# Patient Record
Sex: MALE | Race: WHITE | NOT HISPANIC OR LATINO
[De-identification: names, ages, dates, MRNs, and addresses within clinical notes are randomized per-mention and may not be internally consistent; named-entity substitution may affect disease eponyms.]

---

## 2017-01-11 ENCOUNTER — APPOINTMENT (OUTPATIENT)
Dept: CARDIOTHORACIC SURGERY | Facility: CLINIC | Age: 67
End: 2017-01-11

## 2017-01-13 ENCOUNTER — APPOINTMENT (OUTPATIENT)
Dept: CARDIOTHORACIC SURGERY | Facility: CLINIC | Age: 67
End: 2017-01-13

## 2017-01-13 VITALS
TEMPERATURE: 97.4 F | HEART RATE: 70 BPM | HEIGHT: 72 IN | WEIGHT: 213 LBS | OXYGEN SATURATION: 98 % | BODY MASS INDEX: 28.85 KG/M2 | DIASTOLIC BLOOD PRESSURE: 71 MMHG | RESPIRATION RATE: 16 BRPM | SYSTOLIC BLOOD PRESSURE: 170 MMHG

## 2017-01-13 DIAGNOSIS — Z78.9 OTHER SPECIFIED HEALTH STATUS: ICD-10-CM

## 2017-01-13 DIAGNOSIS — Z71.9 COUNSELING, UNSPECIFIED: ICD-10-CM

## 2017-01-26 ENCOUNTER — EMERGENCY (EMERGENCY)
Facility: HOSPITAL | Age: 67
LOS: 1 days | Discharge: HOME | End: 2017-01-26
Attending: EMERGENCY MEDICINE | Admitting: EMERGENCY MEDICINE
Payer: MEDICARE

## 2017-01-26 VITALS
DIASTOLIC BLOOD PRESSURE: 73 MMHG | HEART RATE: 66 BPM | RESPIRATION RATE: 14 BRPM | HEIGHT: 71 IN | TEMPERATURE: 98 F | SYSTOLIC BLOOD PRESSURE: 104 MMHG | WEIGHT: 210.1 LBS | OXYGEN SATURATION: 95 %

## 2017-01-26 VITALS
TEMPERATURE: 99 F | OXYGEN SATURATION: 96 % | DIASTOLIC BLOOD PRESSURE: 84 MMHG | SYSTOLIC BLOOD PRESSURE: 132 MMHG | RESPIRATION RATE: 20 BRPM | HEART RATE: 71 BPM

## 2017-01-26 DIAGNOSIS — R55 SYNCOPE AND COLLAPSE: ICD-10-CM

## 2017-01-26 DIAGNOSIS — Z98.890 OTHER SPECIFIED POSTPROCEDURAL STATES: ICD-10-CM

## 2017-01-26 DIAGNOSIS — I10 ESSENTIAL (PRIMARY) HYPERTENSION: ICD-10-CM

## 2017-01-26 DIAGNOSIS — I72.4 ANEURYSM OF ARTERY OF LOWER EXTREMITY: Chronic | ICD-10-CM

## 2017-01-26 DIAGNOSIS — E78.5 HYPERLIPIDEMIA, UNSPECIFIED: ICD-10-CM

## 2017-01-26 DIAGNOSIS — Z88.0 ALLERGY STATUS TO PENICILLIN: ICD-10-CM

## 2017-01-26 LAB
ALBUMIN SERPL ELPH-MCNC: 3.4 G/DL — SIGNIFICANT CHANGE UP (ref 3.3–5)
ALP SERPL-CCNC: 53 U/L — SIGNIFICANT CHANGE UP (ref 40–120)
ALT FLD-CCNC: 48 U/L — SIGNIFICANT CHANGE UP (ref 12–78)
ANION GAP SERPL CALC-SCNC: 10 MMOL/L — SIGNIFICANT CHANGE UP (ref 5–17)
APTT BLD: 26 SEC — LOW (ref 27.5–37.4)
AST SERPL-CCNC: 52 U/L — HIGH (ref 15–37)
BASOPHILS # BLD AUTO: 0.1 K/UL — SIGNIFICANT CHANGE UP (ref 0–0.2)
BASOPHILS NFR BLD AUTO: 1.4 % — SIGNIFICANT CHANGE UP (ref 0–2)
BILIRUB SERPL-MCNC: 0.7 MG/DL — SIGNIFICANT CHANGE UP (ref 0.2–1.2)
BUN SERPL-MCNC: 24 MG/DL — HIGH (ref 7–23)
CALCIUM SERPL-MCNC: 8.7 MG/DL — SIGNIFICANT CHANGE UP (ref 8.5–10.1)
CHLORIDE SERPL-SCNC: 106 MMOL/L — SIGNIFICANT CHANGE UP (ref 96–108)
CK MB BLD-MCNC: 0.7 % — SIGNIFICANT CHANGE UP (ref 0–3.5)
CK MB CFR SERPL CALC: 2 NG/ML — SIGNIFICANT CHANGE UP (ref 0–3.6)
CK MB CFR SERPL CALC: 2 NG/ML — SIGNIFICANT CHANGE UP (ref 0–3.6)
CK SERPL-CCNC: 274 U/L — SIGNIFICANT CHANGE UP (ref 26–308)
CO2 SERPL-SCNC: 24 MMOL/L — SIGNIFICANT CHANGE UP (ref 22–31)
CREAT SERPL-MCNC: 1.3 MG/DL — SIGNIFICANT CHANGE UP (ref 0.5–1.3)
EOSINOPHIL # BLD AUTO: 0.1 K/UL — SIGNIFICANT CHANGE UP (ref 0–0.5)
EOSINOPHIL NFR BLD AUTO: 2 % — SIGNIFICANT CHANGE UP (ref 0–6)
GLUCOSE SERPL-MCNC: 117 MG/DL — HIGH (ref 70–99)
HCT VFR BLD CALC: 44.1 % — SIGNIFICANT CHANGE UP (ref 39–50)
HGB BLD-MCNC: 14.6 G/DL — SIGNIFICANT CHANGE UP (ref 13–17)
INR BLD: 1 RATIO — SIGNIFICANT CHANGE UP (ref 0.88–1.16)
LYMPHOCYTES # BLD AUTO: 0.6 K/UL — LOW (ref 1–3.3)
LYMPHOCYTES # BLD AUTO: 11.8 % — LOW (ref 13–44)
MCHC RBC-ENTMCNC: 32.9 PG — SIGNIFICANT CHANGE UP (ref 27–34)
MCHC RBC-ENTMCNC: 33.1 GM/DL — SIGNIFICANT CHANGE UP (ref 32–36)
MCV RBC AUTO: 99.2 FL — SIGNIFICANT CHANGE UP (ref 80–100)
MONOCYTES # BLD AUTO: 0.5 K/UL — SIGNIFICANT CHANGE UP (ref 0–0.9)
MONOCYTES NFR BLD AUTO: 10.2 % — HIGH (ref 1–9)
NEUTROPHILS # BLD AUTO: 3.6 K/UL — SIGNIFICANT CHANGE UP (ref 1.8–7.4)
NEUTROPHILS NFR BLD AUTO: 74.6 % — SIGNIFICANT CHANGE UP (ref 43–77)
PLATELET # BLD AUTO: 140 K/UL — LOW (ref 150–400)
POTASSIUM SERPL-MCNC: 4.4 MMOL/L — SIGNIFICANT CHANGE UP (ref 3.5–5.3)
POTASSIUM SERPL-SCNC: 4.4 MMOL/L — SIGNIFICANT CHANGE UP (ref 3.5–5.3)
PROT SERPL-MCNC: 7.8 G/DL — SIGNIFICANT CHANGE UP (ref 6–8.3)
PROTHROM AB SERPL-ACNC: 11.1 SEC — SIGNIFICANT CHANGE UP (ref 10–13.1)
RBC # BLD: 4.44 M/UL — SIGNIFICANT CHANGE UP (ref 4.2–5.8)
RBC # FLD: 12.3 % — SIGNIFICANT CHANGE UP (ref 10.3–14.5)
SODIUM SERPL-SCNC: 140 MMOL/L — SIGNIFICANT CHANGE UP (ref 135–145)
TROPONIN I SERPL-MCNC: <.015 NG/ML — SIGNIFICANT CHANGE UP (ref 0.01–0.04)
TROPONIN I SERPL-MCNC: <.015 NG/ML — SIGNIFICANT CHANGE UP (ref 0.01–0.04)
WBC # BLD: 4.9 K/UL — SIGNIFICANT CHANGE UP (ref 3.8–10.5)
WBC # FLD AUTO: 4.9 K/UL — SIGNIFICANT CHANGE UP (ref 3.8–10.5)

## 2017-01-26 PROCEDURE — 85730 THROMBOPLASTIN TIME PARTIAL: CPT

## 2017-01-26 PROCEDURE — 84484 ASSAY OF TROPONIN QUANT: CPT

## 2017-01-26 PROCEDURE — 85610 PROTHROMBIN TIME: CPT

## 2017-01-26 PROCEDURE — 82550 ASSAY OF CK (CPK): CPT

## 2017-01-26 PROCEDURE — 93005 ELECTROCARDIOGRAM TRACING: CPT

## 2017-01-26 PROCEDURE — 36000 PLACE NEEDLE IN VEIN: CPT

## 2017-01-26 PROCEDURE — 85027 COMPLETE CBC AUTOMATED: CPT

## 2017-01-26 PROCEDURE — 71010: CPT | Mod: 26

## 2017-01-26 PROCEDURE — 71045 X-RAY EXAM CHEST 1 VIEW: CPT

## 2017-01-26 PROCEDURE — 82553 CREATINE MB FRACTION: CPT

## 2017-01-26 PROCEDURE — 99284 EMERGENCY DEPT VISIT MOD MDM: CPT

## 2017-01-26 PROCEDURE — 80053 COMPREHEN METABOLIC PANEL: CPT

## 2017-01-26 PROCEDURE — 99285 EMERGENCY DEPT VISIT HI MDM: CPT

## 2017-01-26 NOTE — ED PROVIDER NOTE - PLAN OF CARE
Return to the ED for any new or worsening symptoms  Take your medication as prescribed  Follow up with your PMD within the week for a recheck   Follow up with cardiology within the week

## 2017-01-26 NOTE — ED ADULT NURSE NOTE - PMH
Bleeding Ulcer    HLD (Hyperlipidemia)    HTN - Hypertension    IH (Inguinal Hernia)    Incisional Hernia    Personal History of Prostate Cancer    Syncope and collapse

## 2017-01-26 NOTE — ED PROVIDER NOTE - CHPI ED SYMPTOMS NEG
no vomiting/no shortness of breath/no cough/no syncope/no chills/no diaphoresis/no fever/no nausea/no dizziness

## 2017-01-26 NOTE — ED ADULT TRIAGE NOTE - CHIEF COMPLAINT QUOTE
"I was at the urologist office, I got some bad news about my cancer and I passed out."  pt states he has long history of syncopal episodes, denies dizziness or lightheadedness now

## 2017-01-26 NOTE — ED ADULT NURSE NOTE - OBJECTIVE STATEMENT
Pt came to ED sent from MD's office s/p syncope. Patient was at his MD's office when he got bad news and syncopized in the office. Per pt "I pass out all the time." Patient was BIBA - EKG performed, Lab work sent. All VSS at this time. 22G IV to left hand flushing well. Will cont. to monitor.

## 2017-01-26 NOTE — ED ADULT NURSE NOTE - PSH
Aneurysm of leg, right    Arthroscopy of Right Knee    S/P Exploratory Laparotomy  x 3 - bleeding ulcers 2010  S/P Radical Cystoprostatectomy  6/21/10  Sinus Surgery

## 2017-01-26 NOTE — ED ADULT NURSE REASSESSMENT NOTE - NS ED NURSE REASSESS COMMENT FT1
discharge instruction explained and a hard copy provided. patient is been discharged in stable conditions

## 2017-01-26 NOTE — ED PROVIDER NOTE - OBJECTIVE STATEMENT
Pt is a 65 yo male who presents to the ED s/p syncopal episode.  Pt reports that he has a history of syncope

## 2017-01-26 NOTE — ED PROVIDER NOTE - CARE PLAN
Principal Discharge DX:	Syncope and collapse  Instructions for follow-up, activity and diet:	Return to the ED for any new or worsening symptoms  Take your medication as prescribed  Follow up with your PMD within the week for a recheck   Follow up with cardiology within the week

## 2017-01-27 ENCOUNTER — OTHER (OUTPATIENT)
Age: 67
End: 2017-01-27

## 2018-01-29 PROBLEM — R55 SYNCOPE AND COLLAPSE: Chronic | Status: ACTIVE | Noted: 2017-01-26

## 2018-03-28 ENCOUNTER — NON-APPOINTMENT (OUTPATIENT)
Age: 68
End: 2018-03-28

## 2018-03-28 ENCOUNTER — APPOINTMENT (OUTPATIENT)
Dept: CARDIOLOGY | Facility: CLINIC | Age: 68
End: 2018-03-28
Payer: MEDICARE

## 2018-03-28 VITALS
HEIGHT: 71.5 IN | BODY MASS INDEX: 28.76 KG/M2 | HEART RATE: 72 BPM | SYSTOLIC BLOOD PRESSURE: 137 MMHG | DIASTOLIC BLOOD PRESSURE: 88 MMHG | WEIGHT: 210 LBS | OXYGEN SATURATION: 98 %

## 2018-03-28 PROCEDURE — 99214 OFFICE O/P EST MOD 30 MIN: CPT

## 2018-03-28 PROCEDURE — 93000 ELECTROCARDIOGRAM COMPLETE: CPT

## 2018-04-13 ENCOUNTER — APPOINTMENT (OUTPATIENT)
Dept: CARDIOLOGY | Facility: CLINIC | Age: 68
End: 2018-04-13
Payer: MEDICARE

## 2018-04-13 PROCEDURE — 93306 TTE W/DOPPLER COMPLETE: CPT

## 2018-10-22 ENCOUNTER — APPOINTMENT (OUTPATIENT)
Dept: CARDIOLOGY | Facility: CLINIC | Age: 68
End: 2018-10-22
Payer: MEDICARE

## 2018-10-22 ENCOUNTER — NON-APPOINTMENT (OUTPATIENT)
Age: 68
End: 2018-10-22

## 2018-10-22 VITALS
BODY MASS INDEX: 28.89 KG/M2 | HEIGHT: 71.5 IN | HEART RATE: 74 BPM | OXYGEN SATURATION: 97 % | DIASTOLIC BLOOD PRESSURE: 88 MMHG | WEIGHT: 211 LBS | SYSTOLIC BLOOD PRESSURE: 151 MMHG

## 2018-10-22 PROCEDURE — 93000 ELECTROCARDIOGRAM COMPLETE: CPT

## 2018-10-22 PROCEDURE — 99214 OFFICE O/P EST MOD 30 MIN: CPT

## 2019-04-05 ENCOUNTER — APPOINTMENT (OUTPATIENT)
Dept: CARDIOLOGY | Facility: CLINIC | Age: 69
End: 2019-04-05
Payer: MEDICARE

## 2019-04-05 PROCEDURE — 93306 TTE W/DOPPLER COMPLETE: CPT

## 2019-04-09 ENCOUNTER — APPOINTMENT (OUTPATIENT)
Dept: CARDIOLOGY | Facility: CLINIC | Age: 69
End: 2019-04-09
Payer: MEDICARE

## 2019-04-09 PROCEDURE — 93880 EXTRACRANIAL BILAT STUDY: CPT

## 2019-05-03 ENCOUNTER — NON-APPOINTMENT (OUTPATIENT)
Age: 69
End: 2019-05-03

## 2019-05-03 ENCOUNTER — APPOINTMENT (OUTPATIENT)
Dept: CARDIOLOGY | Facility: CLINIC | Age: 69
End: 2019-05-03
Payer: MEDICARE

## 2019-05-03 VITALS
WEIGHT: 215 LBS | BODY MASS INDEX: 29.44 KG/M2 | SYSTOLIC BLOOD PRESSURE: 143 MMHG | DIASTOLIC BLOOD PRESSURE: 88 MMHG | OXYGEN SATURATION: 96 % | HEIGHT: 71.5 IN | HEART RATE: 65 BPM

## 2019-05-03 PROCEDURE — 99214 OFFICE O/P EST MOD 30 MIN: CPT

## 2019-05-03 PROCEDURE — 93000 ELECTROCARDIOGRAM COMPLETE: CPT

## 2019-11-01 ENCOUNTER — NON-APPOINTMENT (OUTPATIENT)
Age: 69
End: 2019-11-01

## 2019-11-01 ENCOUNTER — APPOINTMENT (OUTPATIENT)
Dept: CARDIOLOGY | Facility: CLINIC | Age: 69
End: 2019-11-01
Payer: MEDICARE

## 2019-11-01 VITALS
OXYGEN SATURATION: 97 % | DIASTOLIC BLOOD PRESSURE: 87 MMHG | HEIGHT: 71.5 IN | WEIGHT: 211 LBS | SYSTOLIC BLOOD PRESSURE: 149 MMHG | HEART RATE: 72 BPM | BODY MASS INDEX: 28.89 KG/M2

## 2019-11-01 DIAGNOSIS — I65.29 OCCLUSION AND STENOSIS OF UNSPECIFIED CAROTID ARTERY: ICD-10-CM

## 2019-11-01 PROCEDURE — 99214 OFFICE O/P EST MOD 30 MIN: CPT

## 2019-11-01 PROCEDURE — 93000 ELECTROCARDIOGRAM COMPLETE: CPT

## 2019-12-09 ENCOUNTER — TRANSCRIPTION ENCOUNTER (OUTPATIENT)
Age: 69
End: 2019-12-09

## 2020-01-13 ENCOUNTER — APPOINTMENT (OUTPATIENT)
Dept: CARDIOTHORACIC SURGERY | Facility: CLINIC | Age: 70
End: 2020-01-13
Payer: MEDICARE

## 2020-01-13 VITALS
OXYGEN SATURATION: 99 % | HEIGHT: 71.5 IN | SYSTOLIC BLOOD PRESSURE: 159 MMHG | WEIGHT: 210 LBS | TEMPERATURE: 98.5 F | RESPIRATION RATE: 16 BRPM | BODY MASS INDEX: 28.76 KG/M2 | DIASTOLIC BLOOD PRESSURE: 86 MMHG | HEART RATE: 57 BPM

## 2020-01-13 DIAGNOSIS — Z82.3 FAMILY HISTORY OF STROKE: ICD-10-CM

## 2020-01-13 PROCEDURE — 99214 OFFICE O/P EST MOD 30 MIN: CPT

## 2020-01-13 NOTE — HISTORY OF PRESENT ILLNESS
[FreeTextEntry1] : Mr. Dupont is a  69 years old and is a retired [employer]  with past medical history of  hyperlipidemia, hypertension,  Known mitral regurgitation for 5 years , prostatectomy for prostate cancer in 2010 ( recurrence locally, underwent 9 weeks radiation in 2017 as well as hormonal therapy followed by  ) and gastrointestinal bleeding requiring packed red blood cells and emergent partial gastrectomy in 2010  .His cardiologist is being monitoring his Mitral Regurgitation with Echocardiogram every 6 months . 4/5/19 ECHO revealed EF 59%, possible bileaflet mitral valve prolapse. Mitral annular calcification. Moderate to severe mitral regurgitation. Minimal aortic regurgitation. Mild tricuspid regurgitation . He is currently asymptomatic. He is here for follow up visit with Echocardiogram. Denies any chest pain, palpitations, shortness of breath or pedal edema.

## 2020-01-13 NOTE — PHYSICAL EXAM
[Sclera] : the sclera and conjunctiva were normal [PERRL With Normal Accommodation] : pupils were equal in size, round, and reactive to light [Neck Appearance] : the appearance of the neck was normal [] : no respiratory distress [Respiration, Rhythm And Depth] : normal respiratory rhythm and effort [Auscultation Breath Sounds / Voice Sounds] : lungs were clear to auscultation bilaterally [Apical Impulse] : the apical impulse was normal [Heart Rate And Rhythm] : heart rate was normal and rhythm regular [Heart Sounds] : normal S1 and S2 [Systolic grade ___/6] : A grade [unfilled]/6 systolic murmur was heard. [Examination Of The Chest] : the chest was normal in appearance [2+] : left 2+ [Breast Appearance] : normal in appearance [Bowel Sounds] : normal bowel sounds [Abdomen Soft] : soft [No CVA Tenderness] : no ~M costovertebral angle tenderness [Abnormal Walk] : normal gait [Musculoskeletal - Swelling] : no joint swelling seen [Involuntary Movements] : no involuntary movements were seen [Skin Color & Pigmentation] : normal skin color and pigmentation [Oriented To Time, Place, And Person] : oriented to person, place, and time [No Focal Deficits] : no focal deficits [Skin Turgor] : normal skin turgor [Affect] : the affect was normal [Impaired Insight] : insight and judgment were intact [Memory Remote] : remote memory was not impaired [Mood] : the mood was normal [Memory Recent] : recent memory was not impaired [FreeTextEntry1] : Deferred

## 2020-01-13 NOTE — CONSULT LETTER
[Dear  ___] : Dear  [unfilled], [Courtesy Letter:] : I had the pleasure of seeing your patient, [unfilled], in my office today. [Please see my note below.] : Please see my note below. [Consult Closing:] : Thank you very much for allowing me to participate in the care of this patient.  If you have any questions, please do not hesitate to contact me. [Sincerely,] : Sincerely, [FreeTextEntry2] : Sudarshan Hamlin M.D.\par [address] [facility]\[place] [address]\par [phone]   fax: [phone] [FreeTextEntry3] : Jamison Mckeon MD\par  & \par \par Cardiovascular & Thoracic Surgery\par [facility] \par [address]\par [facility] [address]\par \par

## 2020-01-13 NOTE — ASSESSMENT
[FreeTextEntry1] : Mr. Dupont is a  69 years old and is a retired [employer]  with past medical history of  hyperlipidemia, hypertension,  Known mitral regurgitation for 5 years , prostatectomy for prostate cancer in 2010 ( recurrence locally, underwent 9 weeks radiation in 2017 as well as hormonal therapy followed by  ) and gastrointestinal bleeding requiring packed red blood cells and emergent partial gastrectomy in 2010  .His cardiologist is being monitoring his Mitral Regurgitation with Echocardiogram every 6 months . 4/5/19 ECHO revealed EF 59%, possible bileaflet mitral valve prolapse. Mitral annular calcification. Moderate to severe mitral regurgitation. Minimal aortic regurgitation. Mild tricuspid regurgitation . He is currently asymptomatic. He is here for follow up visit with Echocardiogram. Denies any chest pain, palpitations, shortness of breath or pedal edema. \par \par I reviewed the Echocardiogram and explained to patient. 1/13/20 Echo preliminary results revealed severe mitral regurgitation, final results  pending \par \par Plan:\par 1) Follow up with Echocardiogram in 6 months \par 2) Follow up with Cardiologist and PCP\par 3) Continue current medication regimen\par 4) May return to PRN basis

## 2020-01-13 NOTE — DATA REVIEWED
[FreeTextEntry1] : 4/5/19 ECHO revealed EF 59%, possible bileaflet mitral valve prolapse. Mitral annular calcification. Moderate to severe mitral regurgitation. Minimal aortic regurgitation. Mild tricuspid regurgitation .\par \par 1/13/20 Echo preliminary results revealed severe mitral regurgitation, final results  pending

## 2020-05-22 DIAGNOSIS — Z09 ENCOUNTER FOR FOLLOW-UP EXAMINATION AFTER COMPLETED TREATMENT FOR CONDITIONS OTHER THAN MALIGNANT NEOPLASM: ICD-10-CM

## 2020-07-08 ENCOUNTER — APPOINTMENT (OUTPATIENT)
Dept: CARDIOTHORACIC SURGERY | Facility: CLINIC | Age: 70
End: 2020-07-08

## 2020-08-04 ENCOUNTER — APPOINTMENT (OUTPATIENT)
Dept: CARDIOLOGY | Facility: CLINIC | Age: 70
End: 2020-08-04

## 2020-08-14 ENCOUNTER — APPOINTMENT (OUTPATIENT)
Dept: CARDIOLOGY | Facility: CLINIC | Age: 70
End: 2020-08-14

## 2020-10-19 ENCOUNTER — APPOINTMENT (OUTPATIENT)
Dept: CARDIOLOGY | Facility: CLINIC | Age: 70
End: 2020-10-19
Payer: MEDICARE

## 2020-10-19 PROCEDURE — 93306 TTE W/DOPPLER COMPLETE: CPT

## 2020-10-26 ENCOUNTER — NON-APPOINTMENT (OUTPATIENT)
Age: 70
End: 2020-10-26

## 2020-10-26 ENCOUNTER — APPOINTMENT (OUTPATIENT)
Dept: CARDIOLOGY | Facility: CLINIC | Age: 70
End: 2020-10-26
Payer: MEDICARE

## 2020-10-26 VITALS
WEIGHT: 210 LBS | DIASTOLIC BLOOD PRESSURE: 84 MMHG | BODY MASS INDEX: 28.76 KG/M2 | OXYGEN SATURATION: 100 % | HEIGHT: 71.5 IN | HEART RATE: 55 BPM | SYSTOLIC BLOOD PRESSURE: 150 MMHG

## 2020-10-26 PROCEDURE — 93000 ELECTROCARDIOGRAM COMPLETE: CPT

## 2020-10-26 PROCEDURE — 99214 OFFICE O/P EST MOD 30 MIN: CPT

## 2020-10-26 NOTE — PHYSICAL EXAM
[General Appearance - Well Developed] : well developed [Normal Appearance] : normal appearance [Well Groomed] : well groomed [General Appearance - Well Nourished] : well nourished [No Deformities] : no deformities [General Appearance - In No Acute Distress] : no acute distress [Normal Conjunctiva] : the conjunctiva exhibited no abnormalities [Eyelids - No Xanthelasma] : the eyelids demonstrated no xanthelasmas [Normal Oral Mucosa] : normal oral mucosa [No Oral Pallor] : no oral pallor [No Oral Cyanosis] : no oral cyanosis [Normal Jugular Venous A Waves Present] : normal jugular venous A waves present [Normal Jugular Venous V Waves Present] : normal jugular venous V waves present [No Jugular Venous Farrar A Waves] : no jugular venous farrar A waves [Respiration, Rhythm And Depth] : normal respiratory rhythm and effort [Exaggerated Use Of Accessory Muscles For Inspiration] : no accessory muscle use [Auscultation Breath Sounds / Voice Sounds] : lungs were clear to auscultation bilaterally [Abdomen Soft] : soft [Abdomen Tenderness] : non-tender [Abdomen Mass (___ Cm)] : no abdominal mass palpated [Abnormal Walk] : normal gait [Gait - Sufficient For Exercise Testing] : the gait was sufficient for exercise testing [Nail Clubbing] : no clubbing of the fingernails [Cyanosis, Localized] : no localized cyanosis [Petechial Hemorrhages (___cm)] : no petechial hemorrhages [Skin Color & Pigmentation] : normal skin color and pigmentation [] : no rash [No Venous Stasis] : no venous stasis [Skin Lesions] : no skin lesions [No Skin Ulcers] : no skin ulcer [No Xanthoma] : no  xanthoma was observed [Oriented To Time, Place, And Person] : oriented to person, place, and time [Affect] : the affect was normal [Mood] : the mood was normal [No Anxiety] : not feeling anxious [5th Left ICS - MCL] : palpated at the 5th LICS in the midclavicular line [Normal] : normal [No Precordial Heave] : no precordial heave was noted [Apical Thrill] : no thrill palpable at the apex [Normal Rate] : normal [Heart Rate ___] : [unfilled] bpm [Rhythm Regular] : regular [Normal S1] : normal S1 [Normal S2] : normal S2 [No Gallop] : no gallop heard [S3] : no S3 [S4] : no S4 [Click] : no click [Pericardial Rub] : no pericardial rub [III] : a grade 3 [Holosystolic] : holosystolic [Medium] : medium pitched [Blowing] : blowing [Axilla] : the murmur was transmitted to the axilla [Right Carotid Bruit] : no bruit heard over the right carotid [Left Carotid Bruit] : no bruit heard over the left carotid [Right Femoral Bruit] : no bruit heard over the right femoral artery [Left Femoral Bruit] : no bruit heard over the left femoral artery [2+] : left 2+ [No Abnormalities] : the abdominal aorta was not enlarged and no bruit was heard [Bruit] : no bruit heard [No Pitting Edema] : no pitting edema present [Rt] : no varicose veins of the right leg [Lt] : no varicose veins of the left leg

## 2020-10-26 NOTE — CARDIOLOGY SUMMARY
[Normal] : normal [No Ischemia] : no Ischemia [___] : [unfilled] [LVEF ___%] : LVEF [unfilled]% [None] : no pulmonary hypertension [Enlarged] : enlarged LA size [Moderate] : moderate mitral regurgitation

## 2020-10-26 NOTE — REVIEW OF SYSTEMS
[Shortness Of Breath] : no shortness of breath [Dyspnea on exertion] : not dyspnea during exertion [Chest  Pressure] : no chest pressure [Chest Pain] : no chest pain [Lower Ext Edema] : no extremity edema [Leg Claudication] : no intermittent leg claudication [Palpitations] : no palpitations [Cough] : no cough [Wheezing] : no wheezing [Abdominal Pain] : no abdominal pain [Nausea] : no nausea [Heartburn] : no heartburn [Change in Appetite] : no change in appetite [Dysphagia] : no dysphagia [Urinary Frequency] : no change in urinary frequency [Hematuria] : no hematuria [Negative] : Endocrine

## 2020-10-26 NOTE — HISTORY OF PRESENT ILLNESS
[FreeTextEntry1] : Mr. Dupont returns for evaluation of his mitral regurgitation, hypertension, and hyperlipidemia. He is now 70 years old and is a retired [employer]   who is feeling well. He remains saddened by his wife's recent death but he is enjoying his 4 grandchildren. He reports no chest pain, dyspnea, palpitations, lightheadedness, or syncope.\par He will be spending more time in [place] over the next year.\par \par He was noted to have a recurrence of his prostate cancer locally and underwent 9 weeks of radiation therapy as well as continued hormonal therapy followed by Dr. Masterson\par \par \par Past medical history is remarkable for hyperlipidemia, hypertension, hernia repair,  mitral regurgitation, prostatectomy for prostate cancer, and gastrointestinal bleeding requiring packed red blood cells and emergent partial gastrectomy.

## 2020-10-26 NOTE — DISCUSSION/SUMMARY
[FreeTextEntry1] : Mr. Dupont appears well with no symptoms suggestive of progressive valvular disease or other active cardiac issues. He is tolerating his medication and his lipid profile has been within accepted NCEP guidelines. We will continue his simvastatin 20 mg daily and he will check blood work in several months. Last echocardiography suggested a small increase in ventricular end-diastolic dimension. We had an extensive discussion concerning the issues surrounding his mitral regurgitation. He will see Dr Mckeon again. We discussed mitral valve repair, MV replacement, and a mitraclip procedure. Counseling on this represented greater than 50% of his visit was 30 minutes in duration.. Further management can be dependent on his clinical course

## 2020-12-02 ENCOUNTER — APPOINTMENT (OUTPATIENT)
Dept: CARDIOTHORACIC SURGERY | Facility: HOSPITAL | Age: 70
End: 2020-12-02

## 2020-12-02 VITALS — WEIGHT: 210 LBS | BODY MASS INDEX: 29.29 KG/M2

## 2020-12-02 VITALS
OXYGEN SATURATION: 100 % | HEART RATE: 74 BPM | DIASTOLIC BLOOD PRESSURE: 88 MMHG | RESPIRATION RATE: 13 BRPM | TEMPERATURE: 98 F | SYSTOLIC BLOOD PRESSURE: 146 MMHG

## 2020-12-02 VITALS — DIASTOLIC BLOOD PRESSURE: 92 MMHG | SYSTOLIC BLOOD PRESSURE: 161 MMHG

## 2020-12-02 VITALS — TEMPERATURE: 98 F | HEIGHT: 71 IN

## 2020-12-03 ENCOUNTER — APPOINTMENT (OUTPATIENT)
Dept: CARDIOTHORACIC SURGERY | Facility: CLINIC | Age: 70
End: 2020-12-03
Payer: MEDICARE

## 2020-12-03 VITALS — HEIGHT: 71 IN | WEIGHT: 210 LBS | BODY MASS INDEX: 29.4 KG/M2

## 2020-12-03 VITALS — SYSTOLIC BLOOD PRESSURE: 162 MMHG | HEART RATE: 69 BPM | DIASTOLIC BLOOD PRESSURE: 88 MMHG | TEMPERATURE: 98.3 F

## 2020-12-03 PROCEDURE — 99213 OFFICE O/P EST LOW 20 MIN: CPT

## 2020-12-09 NOTE — HISTORY OF PRESENT ILLNESS
[FreeTextEntry1] : Mr. Dupont is a 70 years old never smoker and is a retired [employer]  with past medical history of hyperlipidemia, hypertension, known mitral regurgitation, prostatectomy for prostate cancer in 2010 (recurrence locally, underwent 9 weeks radiation in 2017 as well as hormonal therapy followed by ) and gastrointestinal bleeding requiring packed red blood cells (46 PRBCs) and emergent partial gastrectomy in 2010. He spent 90 days at [facility]. \par \par Dr. Hamlin has been is being monitoring his mitral regurgitation with serial echocardiogram every 6 months. He was referred for surgical consultation in January 2017, at the time he was asymptomatic and showed no signs of heart failure. His plan was continued surveillance. He returned in January 2020 for follow up visit. Echocardiogram that day was unchanged from prior and patient remained asymptomatic. He returns for follow up visit today. He feels well today, although still visibly upset about his wife's passing in August 2020. He denies angina, SOB/SPEARS, PND, orthopnea, and syncope. \par \par His wife passed away in August 2020 ( x 50 years) from multiple myeloma and he was her caretaker. He has four grandchildren which he visits once a week ( two on [place] and two who lives in [place] [place].)

## 2020-12-09 NOTE — PHYSICAL EXAM
[Right Carotid Bruit] : no bruit heard over the right carotid [Left Carotid Bruit] : no bruit heard over the left carotid [FreeTextEntry1] : deferred [Anxious] : not anxious [Labile] : not labile [Impaired judgment] : intact judgment [Impaired Insight] : intact insight

## 2020-12-09 NOTE — CONSULT LETTER
[FreeTextEntry2] : Sudarshan Hamlin M.D.\par [address] [facility] [address]\[place] [address]\par [phone]   fax: [phone] [FreeTextEntry3] : Jamison Mckeon MD\par  & \par \par Cardiovascular & Thoracic Surgery\par [facility] \par [address]\par [facility] [address]\par

## 2020-12-09 NOTE — ASSESSMENT
[FreeTextEntry1] : Mr. Dupont is a 70 years old and is a retired [employer]  with past medical history of hyperlipidemia, hypertension, known mitral regurgitation, prostatectomy for prostate cancer in 2010 ( recurrence locally, underwent 9 weeks radiation in 2017 as well as hormonal therapy followed by  and gastrointestinal bleeding requiring packed red blood cells and emergent partial gastrectomy in 2010. \par \par Dr. Hamlin has been is being monitoring his mitral regurgitation with serial echocardiogram every 6 months. He was referred for surgical consultation in January 2017, at the time he was asymptomatic and showed no signs of heart failure. His plan was continued surveillance. He returned in January 2020 for follow up visit. Echocardiogram that day was unchanged from prior and patient remained asymptomatic. He returns for follow up visit today. \par \par Echocardiogram on 10/19/2020 demonstrated MVP involving the posterior mitral leaflet, Moderate to severe MR, EF 61%, LA 3.7 cm, LVIDd 5.4 cm, LVIDs 3.6 cm \par \par Plan:\par 1) Return in 6 months (April 2021) for followup \par 2) Echocardiogram at Dr. Hamlin's office\par 3) Continue medication regimen

## 2020-12-09 NOTE — DATA REVIEWED
[FreeTextEntry1] : Echocardiogram on 10/19/2020 demonstrated MVP involving the posterior mitral leaflet, Moderate to severe MR, EF 61%, LA 3.7 cm, LVIDd 5.4 cm, LVIDs 3.6 cm \par \par Echocardiogram on 1/13/2020 demonstrated MVP involving the posterior mitral leaflet. Moderate anteroseptal mitral regurgitation. Normal left ventricular systolic function. Mild diastolic dysfunction, EF 54% LA 4.0 cm, LVIDd 4.7 cm, LVIDs 3.4, EF 54%\par \par Echocardiogram on 4/5/19 revealed EF 59%, possible bileaflet mitral valve prolapse. Mitral annular calcification. Moderate to severe mitral regurgitation. Minimal aortic regurgitation. Mild tricuspid regurgitation. LA 4.3 cm, LVIDd 5.5 cm, LVIDs 3.9 cm, EF 59%\par \par \par \par \par \par

## 2020-12-09 NOTE — REVIEW OF SYSTEMS
[Fever] : no fever [Chills] : no chills [Feeling Poorly] : not feeling poorly [FreeTextEntry2] : Reports over the last 1-2 years, napping has increased.

## 2020-12-23 PROBLEM — Z09 ENCOUNTER FOR FOLLOW-UP IN OUTPATIENT CLINIC: Status: RESOLVED | Noted: 2020-07-05 | Resolved: 2020-12-23

## 2020-12-26 ENCOUNTER — EMERGENCY (EMERGENCY)
Facility: HOSPITAL | Age: 70
LOS: 1 days | Discharge: HOME | End: 2020-12-26
Attending: STUDENT IN AN ORGANIZED HEALTH CARE EDUCATION/TRAINING PROGRAM | Admitting: STUDENT IN AN ORGANIZED HEALTH CARE EDUCATION/TRAINING PROGRAM
Payer: MEDICARE

## 2020-12-26 VITALS
TEMPERATURE: 98 F | SYSTOLIC BLOOD PRESSURE: 167 MMHG | HEIGHT: 71 IN | OXYGEN SATURATION: 99 % | HEART RATE: 88 BPM | DIASTOLIC BLOOD PRESSURE: 97 MMHG | WEIGHT: 212.08 LBS | RESPIRATION RATE: 16 BRPM

## 2020-12-26 DIAGNOSIS — I72.4 ANEURYSM OF ARTERY OF LOWER EXTREMITY: Chronic | ICD-10-CM

## 2020-12-26 PROCEDURE — 99283 EMERGENCY DEPT VISIT LOW MDM: CPT | Mod: 25

## 2020-12-26 PROCEDURE — 73030 X-RAY EXAM OF SHOULDER: CPT | Mod: 26,LT

## 2020-12-26 PROCEDURE — 99284 EMERGENCY DEPT VISIT MOD MDM: CPT | Mod: 25

## 2020-12-26 PROCEDURE — 73030 X-RAY EXAM OF SHOULDER: CPT

## 2020-12-26 RX ORDER — RAMIPRIL 5 MG
0 CAPSULE ORAL
Qty: 0 | Refills: 0 | DISCHARGE

## 2020-12-26 RX ORDER — LIDOCAINE 4 G/100G
1 CREAM TOPICAL ONCE
Refills: 0 | Status: COMPLETED | OUTPATIENT
Start: 2020-12-26 | End: 2020-12-26

## 2020-12-26 RX ORDER — SIMVASTATIN 20 MG/1
1 TABLET, FILM COATED ORAL
Qty: 0 | Refills: 0 | DISCHARGE

## 2020-12-26 RX ORDER — ACETAMINOPHEN 500 MG
975 TABLET ORAL ONCE
Refills: 0 | Status: COMPLETED | OUTPATIENT
Start: 2020-12-26 | End: 2020-12-26

## 2020-12-26 RX ORDER — LISINOPRIL 2.5 MG/1
40 TABLET ORAL DAILY
Refills: 0 | Status: DISCONTINUED | OUTPATIENT
Start: 2020-12-26 | End: 2020-12-26

## 2020-12-26 RX ORDER — LIDOCAINE 4 G/100G
1 CREAM TOPICAL
Qty: 7 | Refills: 0
Start: 2020-12-26 | End: 2021-01-01

## 2020-12-26 RX ORDER — PANTOPRAZOLE SODIUM 20 MG/1
1 TABLET, DELAYED RELEASE ORAL
Qty: 0 | Refills: 0 | DISCHARGE

## 2020-12-26 RX ADMIN — LIDOCAINE 1 PATCH: 4 CREAM TOPICAL at 07:07

## 2020-12-26 RX ADMIN — LIDOCAINE 1 PATCH: 4 CREAM TOPICAL at 07:09

## 2020-12-26 RX ADMIN — Medication 975 MILLIGRAM(S): at 07:08

## 2020-12-26 NOTE — ED PROVIDER NOTE - NS ED ROS FT
Constitutional: No fever.  Neurological: No headache.  Eyes: No vision changes.   Ears, Nose, Mouth, Throat: No congestion.  Cardiovascular: No chest pain.  Respiratory: No difficulty breathing.  Gastrointestinal: No nausea or vomiting.  Genitourinary: No dysuria.  Musculoskeletal: + shoulder pain.  Integumentary (skin and/or breast): No rash.

## 2020-12-26 NOTE — ED ADULT NURSE NOTE - NSIMPLEMENTINTERV_GEN_ALL_ED
Implemented All Universal Safety Interventions:  [place] to call system. Call bell, personal items and telephone within reach. Instruct patient to call for assistance. Room bathroom lighting operational. Non-slip footwear when patient is off stretcher. Physically safe environment: no spills, clutter or unnecessary equipment. Stretcher in lowest position, wheels locked, appropriate side rails in place.

## 2020-12-26 NOTE — ED PROVIDER NOTE - PATIENT PORTAL LINK FT
You can access the FollowMyHealth Patient Portal offered by [facility] by registering at the following website: [email or web address]. By joining [facility]’s FollowMyHealth portal, you will also be able to view your health information using other applications (apps) compatible with our system. 16-Aug-2020 23:09

## 2020-12-26 NOTE — ED PROVIDER NOTE - PROVIDER TOKENS
PROVIDER:[TOKEN:[[id number]],FOLLOWUP:[4-6 Days]] PROVIDER:[TOKEN:[[id number]],FOLLOWUP:[1-3 Days],ESTABLISHEDPATIENT:[T]]

## 2020-12-26 NOTE — ED PROVIDER NOTE - CLINICAL SUMMARY MEDICAL DECISION MAKING FREE TEXT BOX
here with atraumatic left shoulder pain. likely tendonitis from over use. Cannot rule out rotator cuff pathology. Will get XRs, give pain medications and have patient follow up with Ortho.

## 2020-12-26 NOTE — ED PROVIDER NOTE - PHYSICAL EXAMINATION
Vital signs as available reviewed.  General:  Comfortable, no acute distress.  Head:  Normocephalic, atraumatic.  Eyes:  Conjunctiva pink, no icterus.  Cardiovascular:  Regular rate, no obvious murmur.  Respiratory:  Clear to auscultation, good air entry bilaterally.  Abdomen:  Soft, non-tender.  Musculoskeletal:  + tenderness to palpation over left lateral shoulder. decreased range of motion due to pain. left Distal extremity neurovascularly intact with 2+ radial pulse, 5/5 radial/ulnar/median nerve strength. capillary refill less than 3 seconds.  Neurologic: Alert and oriented, moving all extremities.  Skin:  Warm and dry.

## 2020-12-26 NOTE — ED PROVIDER NOTE - CARE PROVIDER_API CALL
Chase Bower (MD)  Orthopaedic Surgery  [address] [place] [address]  Phone: [phone]  Fax: [phone]  Follow Up Time: 4-6 Days   Pancho Lind  ORTHOPAEDIC SURGERY  [address] [place] [address]  Phone: [phone]  Fax: [phone]  Established Patient  Follow Up Time: 1-3 Days

## 2020-12-26 NOTE — ED PROVIDER NOTE - NSFOLLOWUPINSTRUCTIONS_ED_ALL_ED_FT
Shoulder Sprain    WHAT YOU NEED TO KNOW:    A shoulder sprain happens when a ligament in your shoulder is stretched or torn. Ligaments are the tough tissues that connect bones. Ligaments allow you to lift, lower, and rotate your arm. Shoulder Anatomy         DISCHARGE INSTRUCTIONS:    Return to the emergency department if:     You feel severe pain in your shoulder when you move it, or it is touched.      Your skin feels cold or clammy.      You have numbness, tingling, or a feeling of pins and needles in your shoulder.       The skin on your injured shoulder looks blue or pale.    Call your doctor if:     You have new or increased swelling and pain in your shoulder.      You have new or increased stiffness when you move your injured shoulder.      Your symptoms do not improve within 5 to 7 days.       You have questions or concerns about your condition or care.    Medicines: You may need any of the following:     Acetaminophen decreases pain and fever. It is available without a doctor's order. Ask how much to take and how often to take it. Follow directions. Read the labels of all other medicines you are using to see if they also contain acetaminophen, or ask your doctor or pharmacist. Acetaminophen can cause liver damage if not taken correctly. Do not use more than 4 grams (4,000 milligrams) total of acetaminophen in one day.       NSAIDs, such as ibuprofen, help decrease swelling, pain, and fever. This medicine is available with or without a doctor's order. NSAIDs can cause stomach bleeding or kidney problems in certain people. If you take blood thinner medicine, always ask your healthcare provider if NSAIDs are safe for you. Always read the medicine label and follow directions.      Prescription pain medicine may be given. Ask your healthcare provider how to take this medicine safely. Some prescription pain medicines contain acetaminophen. Do not take other medicines that contain acetaminophen without talking to your healthcare provider. Too much acetaminophen may cause liver damage. Prescription pain medicine may cause constipation. Ask your healthcare provider how to prevent or treat constipation.       Take your medicine as directed. Contact your healthcare provider if you think your medicine is not helping or if you have side effects. Tell him or her if you are allergic to any medicine. Keep a list of the medicines, vitamins, and herbs you take. Include the amounts, and when and why you take them. Bring the list or the pill bottles to follow-up visits. Carry your medicine list with you in case of an emergency.    Follow up with your healthcare provider as directed: Write down your questions so you remember to ask them during your visits.     Self-care:     Rest your shoulder so it can heal. Avoid moving your shoulder as your injury heals. This will help decrease the risk of more damage to your shoulder.      Apply ice on your shoulder for 20 to 30 minutes every 2 hours or as directed. Use an ice pack, or put crushed ice in a plastic bag. Cover it with a towel before you apply it to your shoulder. Ice helps prevent tissue damage and decreases swelling and pain.      Compress your shoulder as directed. Compression provides support and helps decrease swelling and movement so your shoulder can heal. For mild sprains, you may be given a sling to support your arm. You may need a padded brace or a plaster cast to hold your shoulder in place if the sprain is more serious.    How to wear a brace, sling, or splint: A brace, sling, or splint may be needed to limit your movement and protect your injured shoulder.Shoulder Sling         Wear your brace, sling, or splint as directed. You may need to wear it all the time and take it off only to bathe or do exercises. Ask your healthcare provider how long you should wear it.      Keep your skin clean and dry. Padding under your armpit will help absorb sweat and prevent sores on your skin.      Do not hunch your shoulders. This may cause pain. Keep your shoulders relaxed.      Position the sling over your arm and hand so that it also covers your knuckles. This will help the sling support your wrist and hand. Position your wrist higher than your elbow. Your wrist may start to hurt or go numb if your sling is too short.    Physical therapy: A physical therapist teaches you exercises to help improve movement and strength, and to decrease pain.    Prevent another injury:     Do not exercise when you are tired or in pain. Warm up and stretch before you exercise.      Wear equipment to protect yourself when you play sports.      Wear shoes that fit well and run on flat surfaces to prevent falls. Take Tylenol for your pain, if cleared by your PMD take an NSAID such as naproxen.  Follow up with Orthopedics and your PMD.  Calcific Tendinitis    WHAT YOU NEED TO KNOW:    Calcific tendinitis is a condition that occurs when calcium collects in the tendons of the shoulder. Tendons are bands of tissue that connect muscle to bone. The calcium can make the tendons swell and cause severe pain.    DISCHARGE INSTRUCTIONS:    Medicines: You may need any of the following:   •NSAIDs may decrease swelling and pain. This medicine can be bought with or without a doctor's order. This medicine can cause stomach bleeding or kidney problems in certain people. If you take blood thinner medicine, always ask your healthcare provider if NSAIDs are safe for you. Always read the medicine label and follow the directions on it before using this medicine.      •Steroids help decrease inflammation.      •Take your medicine as directed. Contact your healthcare provider if you think your medicine is not helping or if you have side effects. Tell him or her if you are allergic to any medicine. Keep a list of the medicines, vitamins, and herbs you take. Include the amounts, and when and why you take them. Bring the list or the pill bottles to follow-up visits. Carry your medicine list with you in case of an emergency.      Go to physical therapy: A physical therapist can teach you exercises to help improve movement and strength, and to decrease pain.    Apply ice: Apply ice on your shoulder for 15 to 20 minutes every hour or as directed. Use an ice pack, or put crushed ice in a plastic bag. Cover it with a towel. Ice helps prevent tissue damage and decreases swelling and pain.    Apply heat: Apply heat on your shoulder for 20 to 30 minutes every 2 hours for as many days as directed. Heat helps decrease pain and muscle spasms.    Rest your arm: Healthcare providers may have you place an item, such as a ball, between your side and elbow while you rest. This may help decrease stiffness and pain.    Follow up with your healthcare provider as directed: Bring a list of any questions you have so you remember to ask them during your visits.    Contact your healthcare provider if:   •You have worse pain and stiffness in your shoulder.      •You have new or more trouble moving your arm.      •You have questions or concerns about your condition or care.      Return to the emergency department if:   •You cannot move your arm.      •You have severe pain in your arm or shoulder.

## 2020-12-26 NOTE — ED ADULT NURSE NOTE - OBJECTIVE STATEMENT
Pt received sitting on stretcher in NAD. Pt AOx3 C/o paint o left shoulder radiating to arm . Neuro WNL. PERRLA. Lungs CTA, RR even unlabored. Ab soft non tender, + bowel sounds x 4quads. Denies Nausea, Vomiting, Diarrhea. Skin warm, dry, color appropriate for age and race.

## 2020-12-26 NOTE — ED ADULT TRIAGE NOTE - CHIEF COMPLAINT QUOTE
Patient stated he aggravated his left rotator cuff  with the snow shoveling complaining of left shoulder pain started Wednesday denies taking any pain medication.

## 2021-10-25 ENCOUNTER — APPOINTMENT (OUTPATIENT)
Dept: CARDIOLOGY | Facility: CLINIC | Age: 71
End: 2021-10-25
Payer: MEDICARE

## 2021-10-25 ENCOUNTER — MED ADMIN CHARGE (OUTPATIENT)
Age: 71
End: 2021-10-25

## 2021-10-25 PROCEDURE — 93306 TTE W/DOPPLER COMPLETE: CPT

## 2021-10-25 RX ORDER — PERFLUTREN 6.52 MG/ML
6.52 INJECTION, SUSPENSION INTRAVENOUS
Qty: 1 | Refills: 0 | Status: COMPLETED | OUTPATIENT
Start: 2021-10-25

## 2021-10-25 RX ADMIN — PERFLUTREN MG/ML: 6.52 INJECTION, SUSPENSION INTRAVENOUS at 00:00

## 2021-11-04 ENCOUNTER — APPOINTMENT (OUTPATIENT)
Dept: CARDIOLOGY | Facility: CLINIC | Age: 71
End: 2021-11-04
Payer: MEDICARE

## 2021-11-04 ENCOUNTER — NON-APPOINTMENT (OUTPATIENT)
Age: 71
End: 2021-11-04

## 2021-11-04 VITALS
BODY MASS INDEX: 31.22 KG/M2 | SYSTOLIC BLOOD PRESSURE: 156 MMHG | DIASTOLIC BLOOD PRESSURE: 87 MMHG | HEIGHT: 71 IN | HEART RATE: 69 BPM | OXYGEN SATURATION: 99 % | WEIGHT: 223 LBS

## 2021-11-04 DIAGNOSIS — I34.0 NONRHEUMATIC MITRAL (VALVE) INSUFFICIENCY: ICD-10-CM

## 2021-11-04 PROCEDURE — 99214 OFFICE O/P EST MOD 30 MIN: CPT

## 2021-11-04 PROCEDURE — 93000 ELECTROCARDIOGRAM COMPLETE: CPT

## 2022-04-22 NOTE — ED PROVIDER NOTE - OBJECTIVE STATEMENT
flako all pertinent systems normal
70 M history of HTN, prostate CA, bleeding ulcers here complaining of left shoulder pain. Pain started a few days ago after he was shoveling lots of snow. Also had some right sided shoulder pain but it resolved on its own. He has been trying heat and rest on his left arm without significant relief. He did not try any medications. No numbness / weakness of his arm. No fevers, chills, nausea, vomiting, chest pain, shortness of breath. had right rotator cuff injury years ago and this feels similar.

## 2022-04-28 ENCOUNTER — NON-APPOINTMENT (OUTPATIENT)
Age: 72
End: 2022-04-28

## 2022-04-28 ENCOUNTER — APPOINTMENT (OUTPATIENT)
Dept: CARDIOLOGY | Facility: CLINIC | Age: 72
End: 2022-04-28
Payer: MEDICARE

## 2022-04-28 VITALS
SYSTOLIC BLOOD PRESSURE: 147 MMHG | BODY MASS INDEX: 31.64 KG/M2 | HEART RATE: 77 BPM | WEIGHT: 226 LBS | HEIGHT: 71 IN | DIASTOLIC BLOOD PRESSURE: 83 MMHG | OXYGEN SATURATION: 96 %

## 2022-04-28 DIAGNOSIS — E78.5 HYPERLIPIDEMIA, UNSPECIFIED: ICD-10-CM

## 2022-04-28 DIAGNOSIS — I10 ESSENTIAL (PRIMARY) HYPERTENSION: ICD-10-CM

## 2022-04-28 PROCEDURE — 93000 ELECTROCARDIOGRAM COMPLETE: CPT

## 2022-04-28 PROCEDURE — 99214 OFFICE O/P EST MOD 30 MIN: CPT

## 2022-06-10 NOTE — ED ADULT TRIAGE NOTE - ACCOMPANIED BY
Attempts have been made to contact patient with no response. Case request canceled at this time. PCP notified. Phone number provided for call back. Pt will be scheduled from this order upon call back.  Vmail, letter mailed, mychart msg sent.   Self

## 2022-08-17 NOTE — ED PROVIDER NOTE - CARDIAC, MLM
Normal rate, regular rhythm.  Heart sounds S1, S2.  No murmurs, rubs or gallops. Non toxic and well appearing, awake, alert, oriented to person, place, time/situation and in no apparent distress. normal...

## 2023-06-13 NOTE — ED PROVIDER NOTE - NSDCPRINTRESULTS_ED_ALL_ED
Patient requests all Lab and Radiology Results on their Discharge Instructions PAST SURGICAL HISTORY:  No significant past surgical history

## 2025-07-27 NOTE — ED PROVIDER NOTE - MDM PATIENT STATEMENT FOR ADDL TREATMENT
"Report received from NOC RN and assumed patient care at 0700. Patient is A&Ox 4, on RA, and bed alarm in place . Patient reporting a pain level of 0/10. Call light within reach and bed in lowest position. Reinforced the need to call for assistance. Plan of care discussed and patient does not have any further needs at this time.     BP 98/62   Pulse 70   Temp 36.4 °C (97.6 °F) (Temporal)   Resp 17   Ht 1.981 m (6' 6\")   Wt 110 kg (243 lb 2.7 oz)   SpO2 98%   BMI 28.10 kg/m²     " Patient with one or more new problems requiring additional work-up/treatment.